# Patient Record
Sex: FEMALE | Race: BLACK OR AFRICAN AMERICAN | NOT HISPANIC OR LATINO
[De-identification: names, ages, dates, MRNs, and addresses within clinical notes are randomized per-mention and may not be internally consistent; named-entity substitution may affect disease eponyms.]

---

## 2018-12-03 ENCOUNTER — APPOINTMENT (OUTPATIENT)
Dept: INTERNAL MEDICINE | Facility: CLINIC | Age: 48
End: 2018-12-03
Payer: COMMERCIAL

## 2018-12-03 VITALS
SYSTOLIC BLOOD PRESSURE: 120 MMHG | BODY MASS INDEX: 35.16 KG/M2 | WEIGHT: 224 LBS | DIASTOLIC BLOOD PRESSURE: 80 MMHG | HEIGHT: 67 IN | HEART RATE: 64 BPM | OXYGEN SATURATION: 98 %

## 2018-12-03 DIAGNOSIS — Z87.891 PERSONAL HISTORY OF NICOTINE DEPENDENCE: ICD-10-CM

## 2018-12-03 DIAGNOSIS — R94.31 ABNORMAL ELECTROCARDIOGRAM [ECG] [EKG]: ICD-10-CM

## 2018-12-03 DIAGNOSIS — Z00.00 ENCOUNTER FOR GENERAL ADULT MEDICAL EXAMINATION W/OUT ABNORMAL FINDINGS: ICD-10-CM

## 2018-12-03 DIAGNOSIS — Z82.49 FAMILY HISTORY OF ISCHEMIC HEART DISEASE AND OTHER DISEASES OF THE CIRCULATORY SYSTEM: ICD-10-CM

## 2018-12-03 DIAGNOSIS — E66.9 OBESITY, UNSPECIFIED: ICD-10-CM

## 2018-12-03 DIAGNOSIS — K80.20 CALCULUS OF GALLBLADDER W/OUT CHOLECYSTITIS W/OUT OBSTRUCTION: ICD-10-CM

## 2018-12-03 PROCEDURE — 36415 COLL VENOUS BLD VENIPUNCTURE: CPT

## 2018-12-03 PROCEDURE — 99386 PREV VISIT NEW AGE 40-64: CPT | Mod: 25

## 2018-12-03 PROCEDURE — 93000 ELECTROCARDIOGRAM COMPLETE: CPT

## 2018-12-03 PROCEDURE — 94010 BREATHING CAPACITY TEST: CPT

## 2018-12-03 NOTE — PHYSICAL EXAM
[No Acute Distress] : no acute distress [Well Nourished] : well nourished [Well Developed] : well developed [Well-Appearing] : well-appearing [Normal Sclera/Conjunctiva] : normal sclera/conjunctiva [PERRL] : pupils equal round and reactive to light [Normal Outer Ear/Nose] : the outer ears and nose were normal in appearance [Normal Oropharynx] : the oropharynx was normal [Normal TMs] : both tympanic membranes were normal [Supple] : supple [No Lymphadenopathy] : no lymphadenopathy [Thyroid Normal, No Nodules] : the thyroid was normal and there were no nodules present [No Respiratory Distress] : no respiratory distress  [Clear to Auscultation] : lungs were clear to auscultation bilaterally [No Accessory Muscle Use] : no accessory muscle use [Normal Rate] : normal rate  [Regular Rhythm] : with a regular rhythm [Normal S1, S2] : normal S1 and S2 [No Murmur] : no murmur heard [No Edema] : there was no peripheral edema [Soft] : abdomen soft [Non Tender] : non-tender [Non-distended] : non-distended [Normal Bowel Sounds] : normal bowel sounds [Normal Supraclavicular Nodes] : no supraclavicular lymphadenopathy [Normal Axillary Nodes] : no axillary lymphadenopathy [Normal Posterior Cervical Nodes] : no posterior cervical lymphadenopathy [Normal Anterior Cervical Nodes] : no anterior cervical lymphadenopathy [Normal Inguinal Nodes] : no inguinal lymphadenopathy [Grossly Normal Strength/Tone] : grossly normal strength/tone [Coordination Grossly Intact] : coordination grossly intact [No Focal Deficits] : no focal deficits [Normal Affect] : the affect was normal [Normal Insight/Judgement] : insight and judgment were intact [Normal Appearance] : normal in appearance [No Masses] : no palpable masses [No Axillary Lymphadenopathy] : no axillary lymphadenopathy

## 2018-12-03 NOTE — HEALTH RISK ASSESSMENT
[HIV test declined] : HIV test declined [0] : 2) Feeling down, depressed, or hopeless: Not at all (0) [Patient reported PAP Smear was normal] : Patient reported PAP Smear was normal [MammogramComments] : never [PapSmearDate] : 01/2016 [de-identified] : not seen ophth [de-identified] : seen dentist 3 wk ago

## 2018-12-03 NOTE — PLAN
[FreeTextEntry1] : pt refused flu  vaccine.  pt is taking over the counter products and herbal tea- pt to stop these and f/u in 3 wk for repeat EKG\par EKG- sinus klever 51 prolonged QT\par spirometry- FEV1/ FVC nl

## 2018-12-03 NOTE — HISTORY OF PRESENT ILLNESS
[FreeTextEntry1] : CPE [de-identified] : vaccine- pt refused flu vaccine.  2017- tetanus vaccine as per pt\par diet- drinking juice.  no eating fruits, veg\par exercise- spinning classes 2 times/ wk\par

## 2018-12-04 ENCOUNTER — RESULT REVIEW (OUTPATIENT)
Age: 48
End: 2018-12-04

## 2018-12-04 LAB
ALBUMIN SERPL ELPH-MCNC: 4 G/DL
ALP BLD-CCNC: 44 U/L
ALT SERPL-CCNC: 32 U/L
ANION GAP SERPL CALC-SCNC: 11 MMOL/L
AST SERPL-CCNC: 23 U/L
BASOPHILS # BLD AUTO: 0.04 K/UL
BASOPHILS NFR BLD AUTO: 1.1 %
BILIRUB SERPL-MCNC: 0.4 MG/DL
BUN SERPL-MCNC: 12 MG/DL
CALCIUM SERPL-MCNC: 9.3 MG/DL
CHLORIDE SERPL-SCNC: 104 MMOL/L
CHOLEST SERPL-MCNC: 178 MG/DL
CHOLEST/HDLC SERPL: 2 RATIO
CO2 SERPL-SCNC: 24 MMOL/L
CREAT SERPL-MCNC: 0.85 MG/DL
EOSINOPHIL # BLD AUTO: 0.25 K/UL
EOSINOPHIL NFR BLD AUTO: 6.9 %
GLUCOSE SERPL-MCNC: 85 MG/DL
HCT VFR BLD CALC: 38.4 %
HDLC SERPL-MCNC: 89 MG/DL
HGB BLD-MCNC: 12.2 G/DL
IMM GRANULOCYTES NFR BLD AUTO: 0.3 %
LDLC SERPL CALC-MCNC: 80 MG/DL
LDLC SERPL DIRECT ASSAY-MCNC: 91 MG/DL
LYMPHOCYTES # BLD AUTO: 1.63 K/UL
LYMPHOCYTES NFR BLD AUTO: 44.8 %
MAN DIFF?: NORMAL
MCHC RBC-ENTMCNC: 30 PG
MCHC RBC-ENTMCNC: 31.8 GM/DL
MCV RBC AUTO: 94.6 FL
MONOCYTES # BLD AUTO: 0.31 K/UL
MONOCYTES NFR BLD AUTO: 8.5 %
NEUTROPHILS # BLD AUTO: 1.4 K/UL
NEUTROPHILS NFR BLD AUTO: 38.4 %
PLATELET # BLD AUTO: 167 K/UL
POTASSIUM SERPL-SCNC: 4.1 MMOL/L
PROT SERPL-MCNC: 7.7 G/DL
RBC # BLD: 4.06 M/UL
RBC # FLD: 14.7 %
SAVE SPECIMEN: NORMAL
SODIUM SERPL-SCNC: 139 MMOL/L
TRIGL SERPL-MCNC: 43 MG/DL
TSH SERPL-ACNC: 3.91 UIU/ML
WBC # FLD AUTO: 3.64 K/UL

## 2018-12-05 LAB — MAGNESIUM SERPL-MCNC: 1.9 MG/DL

## 2019-01-07 ENCOUNTER — APPOINTMENT (OUTPATIENT)
Dept: INTERNAL MEDICINE | Facility: CLINIC | Age: 49
End: 2019-01-07

## 2020-07-28 ENCOUNTER — APPOINTMENT (OUTPATIENT)
Dept: ENDOCRINOLOGY | Facility: CLINIC | Age: 50
End: 2020-07-28

## 2024-08-08 ENCOUNTER — INPATIENT (INPATIENT)
Facility: HOSPITAL | Age: 54
LOS: 0 days | Discharge: HOME | DRG: 446 | End: 2024-08-09
Attending: STUDENT IN AN ORGANIZED HEALTH CARE EDUCATION/TRAINING PROGRAM | Admitting: HOSPITALIST
Payer: SELF-PAY

## 2024-08-08 VITALS
WEIGHT: 197.98 LBS | OXYGEN SATURATION: 100 % | DIASTOLIC BLOOD PRESSURE: 78 MMHG | SYSTOLIC BLOOD PRESSURE: 120 MMHG | HEIGHT: 67 IN | TEMPERATURE: 98 F | HEART RATE: 53 BPM | RESPIRATION RATE: 17 BRPM

## 2024-08-08 DIAGNOSIS — K80.20 CALCULUS OF GALLBLADDER WITHOUT CHOLECYSTITIS WITHOUT OBSTRUCTION: ICD-10-CM

## 2024-08-08 DIAGNOSIS — R10.9 UNSPECIFIED ABDOMINAL PAIN: ICD-10-CM

## 2024-08-08 DIAGNOSIS — K82.8 OTHER SPECIFIED DISEASES OF GALLBLADDER: ICD-10-CM

## 2024-08-08 DIAGNOSIS — Z98.89 OTHER SPECIFIED POSTPROCEDURAL STATES: Chronic | ICD-10-CM

## 2024-08-08 LAB
ALBUMIN SERPL ELPH-MCNC: 4 G/DL — SIGNIFICANT CHANGE UP (ref 3.3–5)
ALP SERPL-CCNC: 57 U/L — SIGNIFICANT CHANGE UP (ref 40–120)
ALT FLD-CCNC: 15 U/L — SIGNIFICANT CHANGE UP (ref 10–45)
ANION GAP SERPL CALC-SCNC: 13 MMOL/L — SIGNIFICANT CHANGE UP (ref 5–17)
APPEARANCE UR: CLEAR — SIGNIFICANT CHANGE UP
AST SERPL-CCNC: 20 U/L — SIGNIFICANT CHANGE UP (ref 10–40)
BACTERIA # UR AUTO: ABNORMAL /HPF
BASOPHILS # BLD AUTO: 0.04 K/UL — SIGNIFICANT CHANGE UP (ref 0–0.2)
BASOPHILS NFR BLD AUTO: 0.9 % — SIGNIFICANT CHANGE UP (ref 0–2)
BILIRUB SERPL-MCNC: 0.4 MG/DL — SIGNIFICANT CHANGE UP (ref 0.2–1.2)
BILIRUB UR-MCNC: NEGATIVE — SIGNIFICANT CHANGE UP
BUN SERPL-MCNC: 11 MG/DL — SIGNIFICANT CHANGE UP (ref 7–23)
CALCIUM SERPL-MCNC: 9.1 MG/DL — SIGNIFICANT CHANGE UP (ref 8.4–10.5)
CAST: 0 /LPF — SIGNIFICANT CHANGE UP (ref 0–4)
CHLORIDE SERPL-SCNC: 105 MMOL/L — SIGNIFICANT CHANGE UP (ref 96–108)
CO2 SERPL-SCNC: 22 MMOL/L — SIGNIFICANT CHANGE UP (ref 22–31)
COLOR SPEC: YELLOW — SIGNIFICANT CHANGE UP
CREAT SERPL-MCNC: 0.92 MG/DL — SIGNIFICANT CHANGE UP (ref 0.5–1.3)
DIFF PNL FLD: NEGATIVE — SIGNIFICANT CHANGE UP
EGFR: 75 ML/MIN/1.73M2 — SIGNIFICANT CHANGE UP
EOSINOPHIL # BLD AUTO: 0.25 K/UL — SIGNIFICANT CHANGE UP (ref 0–0.5)
EOSINOPHIL NFR BLD AUTO: 5.9 % — SIGNIFICANT CHANGE UP (ref 0–6)
GLUCOSE SERPL-MCNC: 95 MG/DL — SIGNIFICANT CHANGE UP (ref 70–99)
GLUCOSE UR QL: NEGATIVE MG/DL — SIGNIFICANT CHANGE UP
HCG SERPL-ACNC: <2 MIU/ML — SIGNIFICANT CHANGE UP
HCT VFR BLD CALC: 39.2 % — SIGNIFICANT CHANGE UP (ref 34.5–45)
HGB BLD-MCNC: 12.4 G/DL — SIGNIFICANT CHANGE UP (ref 11.5–15.5)
IMM GRANULOCYTES NFR BLD AUTO: 0.2 % — SIGNIFICANT CHANGE UP (ref 0–0.9)
KETONES UR-MCNC: NEGATIVE MG/DL — SIGNIFICANT CHANGE UP
LEUKOCYTE ESTERASE UR-ACNC: NEGATIVE — SIGNIFICANT CHANGE UP
LIDOCAIN IGE QN: 13 U/L — SIGNIFICANT CHANGE UP (ref 7–60)
LYMPHOCYTES # BLD AUTO: 1.74 K/UL — SIGNIFICANT CHANGE UP (ref 1–3.3)
LYMPHOCYTES # BLD AUTO: 41.1 % — SIGNIFICANT CHANGE UP (ref 13–44)
MCHC RBC-ENTMCNC: 29.7 PG — SIGNIFICANT CHANGE UP (ref 27–34)
MCHC RBC-ENTMCNC: 31.6 GM/DL — LOW (ref 32–36)
MCV RBC AUTO: 94 FL — SIGNIFICANT CHANGE UP (ref 80–100)
MONOCYTES # BLD AUTO: 0.31 K/UL — SIGNIFICANT CHANGE UP (ref 0–0.9)
MONOCYTES NFR BLD AUTO: 7.3 % — SIGNIFICANT CHANGE UP (ref 2–14)
NEUTROPHILS # BLD AUTO: 1.88 K/UL — SIGNIFICANT CHANGE UP (ref 1.8–7.4)
NEUTROPHILS NFR BLD AUTO: 44.6 % — SIGNIFICANT CHANGE UP (ref 43–77)
NITRITE UR-MCNC: NEGATIVE — SIGNIFICANT CHANGE UP
NRBC # BLD: 0 /100 WBCS — SIGNIFICANT CHANGE UP (ref 0–0)
NT-PROBNP SERPL-SCNC: 156 PG/ML — SIGNIFICANT CHANGE UP (ref 0–300)
PH UR: 6 — SIGNIFICANT CHANGE UP (ref 5–8)
PLATELET # BLD AUTO: 158 K/UL — SIGNIFICANT CHANGE UP (ref 150–400)
POTASSIUM SERPL-MCNC: 3.7 MMOL/L — SIGNIFICANT CHANGE UP (ref 3.5–5.3)
POTASSIUM SERPL-SCNC: 3.7 MMOL/L — SIGNIFICANT CHANGE UP (ref 3.5–5.3)
PROT SERPL-MCNC: 7.6 G/DL — SIGNIFICANT CHANGE UP (ref 6–8.3)
PROT UR-MCNC: NEGATIVE MG/DL — SIGNIFICANT CHANGE UP
RBC # BLD: 4.17 M/UL — SIGNIFICANT CHANGE UP (ref 3.8–5.2)
RBC # FLD: 13.3 % — SIGNIFICANT CHANGE UP (ref 10.3–14.5)
RBC CASTS # UR COMP ASSIST: 4 /HPF — SIGNIFICANT CHANGE UP (ref 0–4)
SODIUM SERPL-SCNC: 140 MMOL/L — SIGNIFICANT CHANGE UP (ref 135–145)
SP GR SPEC: 1.02 — SIGNIFICANT CHANGE UP (ref 1–1.03)
SQUAMOUS # UR AUTO: 3 /HPF — SIGNIFICANT CHANGE UP (ref 0–5)
TROPONIN T, HIGH SENSITIVITY RESULT: <6 NG/L — SIGNIFICANT CHANGE UP (ref 0–51)
UROBILINOGEN FLD QL: 0.2 MG/DL — SIGNIFICANT CHANGE UP (ref 0.2–1)
WBC # BLD: 4.23 K/UL — SIGNIFICANT CHANGE UP (ref 3.8–10.5)
WBC # FLD AUTO: 4.23 K/UL — SIGNIFICANT CHANGE UP (ref 3.8–10.5)
WBC UR QL: 2 /HPF — SIGNIFICANT CHANGE UP (ref 0–5)

## 2024-08-08 PROCEDURE — 76705 ECHO EXAM OF ABDOMEN: CPT | Mod: 26

## 2024-08-08 PROCEDURE — 71045 X-RAY EXAM CHEST 1 VIEW: CPT | Mod: 26

## 2024-08-08 PROCEDURE — 99253 IP/OBS CNSLTJ NEW/EST LOW 45: CPT

## 2024-08-08 PROCEDURE — 99285 EMERGENCY DEPT VISIT HI MDM: CPT

## 2024-08-08 PROCEDURE — 74177 CT ABD & PELVIS W/CONTRAST: CPT | Mod: 26,MC

## 2024-08-08 PROCEDURE — 99223 1ST HOSP IP/OBS HIGH 75: CPT | Mod: GC

## 2024-08-08 RX ORDER — IBUPROFEN 200 MG
600 TABLET ORAL ONCE
Refills: 0 | Status: COMPLETED | OUTPATIENT
Start: 2024-08-08 | End: 2024-08-08

## 2024-08-08 RX ORDER — ALPRAZOLAM 0.5 MG
1 TABLET ORAL ONCE
Refills: 0 | Status: DISCONTINUED | OUTPATIENT
Start: 2024-08-08 | End: 2024-08-09

## 2024-08-08 RX ORDER — PIPERACILLIN SODIUM, TAZOBACTAM SODIUM 3; .375 G/15ML; G/15ML
3.38 INJECTION, POWDER, LYOPHILIZED, FOR SOLUTION INTRAVENOUS EVERY 8 HOURS
Refills: 0 | Status: DISCONTINUED | OUTPATIENT
Start: 2024-08-08 | End: 2024-08-09

## 2024-08-08 RX ORDER — PIPERACILLIN SODIUM, TAZOBACTAM SODIUM 3; .375 G/15ML; G/15ML
3.38 INJECTION, POWDER, LYOPHILIZED, FOR SOLUTION INTRAVENOUS ONCE
Refills: 0 | Status: COMPLETED | OUTPATIENT
Start: 2024-08-08 | End: 2024-08-08

## 2024-08-08 RX ADMIN — Medication 600 MILLIGRAM(S): at 23:35

## 2024-08-08 RX ADMIN — Medication 600 MILLIGRAM(S): at 10:38

## 2024-08-08 RX ADMIN — Medication 600 MILLIGRAM(S): at 12:00

## 2024-08-08 RX ADMIN — PIPERACILLIN SODIUM, TAZOBACTAM SODIUM 25 GRAM(S): 3; .375 INJECTION, POWDER, LYOPHILIZED, FOR SOLUTION INTRAVENOUS at 23:35

## 2024-08-08 RX ADMIN — PIPERACILLIN SODIUM, TAZOBACTAM SODIUM 200 GRAM(S): 3; .375 INJECTION, POWDER, LYOPHILIZED, FOR SOLUTION INTRAVENOUS at 15:50

## 2024-08-08 NOTE — ED ADULT NURSE NOTE - ED STAT RN HANDOFF DETAILS
1900 Report given to receiving change of shift SERA BEAN patient is in no acute distress. Patient vital signs stable, plan of care explained, in purple

## 2024-08-08 NOTE — ED PROVIDER NOTE - CLINICAL SUMMARY MEDICAL DECISION MAKING FREE TEXT BOX
Cindy: Patient is a 53-year-old who presents to the emergency department with few days of abdominal pain.  Patient finds herself to be more bloated and has pain in the epigastrium and right upper quadrant.  Patient with normal BMs and is urinating normally.  Patient with no CVA tenderness.  Patient on exam has tenderness in the epigastrium and right upper quadrant.  Will look for colitis or gallbladder disease if it is not that gastritis is possible.  Will get CAT scan to evaluate this and labs and urine.

## 2024-08-08 NOTE — H&P ADULT - HISTORY OF PRESENT ILLNESS
54 yo female with no known past medical history presents with right upper quadrant and epigastric pain. The pain worsens when she takes a breath. Denies headache, dizziness, abdominal pain, or urinary symptoms.  53F with no known PMHx presented to ED for abdominal pain x 3 days. Pt states that pain started 3 days ago, also endorses more bloating than usual. Pain located in RUQ and LUQ w/ radiation to the right upper back and L chest. RUQ pain increased w/ inspiration. Denies dysuria. Normal bowel movements. Denies chest pain, nausea/vomiting, dizziness, headaches, fever/chills.    ED course: Pt afebrile, bradycardic (49-53), BP stable. CBC/CMP/urinalysis/CXR unremarkable. S/p 3.375mg Zosyn. CT abd/pelvis revealed marked distension of the gallbladder containing sludge vs. stones with minimal wall thickening, with possibility of acute cholecystitis, as well as mild central intrahepatic biliary duct dilation and ill-defined soft tissue infiltration in the mary hepatis. RUQ US revealed gallbladder distention w/ multiple stones w/o additional evidence of acute cholecystitis. MRCP ordered and pending. Surgery evaluated pt in ED due to c/f acute cholecystitis vs. symptomatic cholelithiasis, with recommendation for lap cholecystectomy. Pt declined surgery and was admitted for medical management. 53F with no known PMHx presented to ED for abdominal pain x 3 days. Pt states that pain started on Monday, 8/5, and acutely worsened yesterday to 10/10 pain that kept her awake last night. The pain started in the middle epigastric region with radiation to right flank around to her right scapula. RUQ pain increased w/ inspiration. The patient has had dark green stools over this same time period which is unusual for her. No diarrhea, no vomiting, no nausea. She also states that she has also felt more stuffed and bloated and has not been able to burp despite feeling the urge. She had a similar episode around 9-10yrs ago that she was treated for at the hospital with medication. Denies chest pain, recent infection, dysuria, nausea/vomiting, dizziness, headaches, fever/chills.     ED course: Pt afebrile, bradycardic (49-53), BP stable. CBC/CMP/urinalysis/CXR unremarkable. S/p 3.375mg Zosyn. CT abd/pelvis revealed marked distension of the gallbladder containing sludge vs. stones with minimal wall thickening, with possibility of acute cholecystitis, as well as mild central intrahepatic biliary duct dilation and ill-defined soft tissue infiltration in the mary hepatis. RUQ US revealed gallbladder distention w/ multiple stones w/o additional evidence of acute cholecystitis. MRCP ordered and pending. Surgery evaluated pt in ED due to c/f acute cholecystitis vs. symptomatic cholelithiasis, with recommendation for lap cholecystectomy. Pt declined surgery and was admitted for medical management.

## 2024-08-08 NOTE — CONSULT NOTE ADULT - SUBJECTIVE AND OBJECTIVE BOX
GENERAL SURGERY CONSULT NOTE  --------------------------------------------------------------------------------------------    HPI:   Patient is a 53y old  F w no PMH, PSHx  x 3 who presents with 3 days of RUQ pain. Pain starts in back and radiates to epigastrium. Denies fevers, chills, nausea, vomiting, PO intolerance, diarrhea or constipation.       PAST MEDICAL & SURGICAL HISTORY:  Acute cholecystitis    History of  section  x3        FAMILY HISTORY:  No pertinent family history in first degree relatives    [] Family history not pertinent as reviewed with the patient and family    SOCIAL HISTORY:  Social alcohol use.  No tobacco.   Teacher    ALLERGIES: Darvon (Hives)  Darvocet A500 (Unknown)  Percocet 10/325 (Unknown)      HOME MEDICATIONS: No meds.     CURRENT MEDICATIONS  MEDICATIONS (STANDING):   MEDICATIONS (PRN):  --------------------------------------------------------------------------------------------    Vitals:   T(C): 36.7 (24 @ 12:56), Max: 36.9 (24 @ 09:11)  HR: 49 (24 @ 12:56) (49 - 53)  BP: 112/64 (24 @ 12:56) (112/64 - 124/70)  RR: 18 (24 @ 12:56) (17 - 18)  SpO2: 100% (24 @ 12:56) (100% - 100%)  CAPILLARY BLOOD GLUCOSE          Height (cm): 170.2 (:11)  Weight (kg): 89.8 (:11)  BMI (kg/m2): 31 (:11)  BSA (m2): 2.01 (:11)      PHYSICAL EXAM:   General: NAD, Lying in bed comfortably  Neuro: A+Ox3  HEENT: NC/AT, EOMI  Neck: Soft, supple  Cardio: RRR  Resp: Good effort, nonlabored breathing on room air  GI/Abd: Soft, TTP RUQ, ND, no rebound/guarding, no masses palpated  Vascular: All 4 extremities warm.  Skin: Intact, no breakdown  Lymphatic/Nodes: No palpable lymphadenopathy  Musculoskeletal: All 4 extremities moving spontaneously, no limitations  --------------------------------------------------------------------------------------------    LABS  CBC (:53)                              12.4                           4.23    )----------------(  158        44.6  % Neutrophils, 41.1  % Lymphocytes, ANC: 1.88                                39.2      BMP (:53)             140     |  105     |  11    		Ca++ --      Ca 9.1                ---------------------------------( 95    		Mg --                 3.7     |  22      |  0.92  			Ph --        LFTs (:53)      TPro 7.6 / Alb 4.0 / TBili 0.4 / DBili -- / AST 20 / ALT 15 / AlkPhos 57          VBG ( 09:55)     7.33 / 54<H> / 33 / 28 / 1.6 / 49.3<L>%     Lactate: 1.4    --------------------------------------------------------------------------------------------    MICROBIOLOGY  Urinalysis ( @ 10:23):     Color: Yellow / Appearance: Clear / S.018 / pH: 6.0 / Gluc: Negative / Ketones: Negative / Bili: Negative / Urobili: 0.2 / Protein :Negative / Nitrites: Negative / Leuk.Est: Negative / RBC: 4 / WBC: 2 / Sq Epi:  / Non Sq Epi: 3 / Bacteria Occasional<!>         --------------------------------------------------------------------------------------------    IMAGING  < from: CT Abdomen and Pelvis w/ IV Cont (24 @ 11:49) >  FINDINGS:  LOWER CHEST: Within normal limits.    LIVER: Indeterminate hypodense lesion measuring 1.3 cm in segment 8   (series 5 image 20), corresponding to a T2 hyperintense lesion that   measured 1.3 cm on the MRI of 1/3/2016, and likely benign.  BILE DUCTS: No overt extrahepatic biliary duct dilation. Mild central   intrahepatic biliary duct dilation. Ill-defined soft tissue infiltration   in the mary hepatis.  GALLBLADDER: Markedly distended. Minimal wall thickening. No significant   pericholecystic fat infiltration appreciated along the fundus or body.   There may be some infiltration near the neck/mary hepatis. Faint   dependent hyperdensity suggestive of stones or sludge.  SPLEEN: Within normal limits.  PANCREAS: Within normal limits.  ADRENALS: Within normal limits.  KIDNEYS/URETERS: Excreted contrast within the collecting systems. No   hydronephrosis. Left renal cyst.    BLADDER: Within normal limits.  REPRODUCTIVE ORGANS: Uterus and adnexa within normal limits.    BOWEL: No bowel obstruction. Appendix is normal.  PERITONEUM/RETROPERITONEUM: Within normal limits.  VESSELS: Mild atherosclerotic changes.  LYMPH NODES: No lymphadenopathy.  ABDOMINAL WALL: Tiny fat-containing umbilical hernia.  BONES: Mild degenerative changes.    IMPRESSION:  *  Marked distention of the gallbladder containing sludge versus stones   with minimal wall thickening. Findings raise the possibility for acute   cholecystitis. Consider further evaluation with right upper quadrant   ultrasound or HIDA scan.  *  Mild central intrahepatic biliary duct dilation. Ill-defined soft   tissue infiltration in the mary hepatis, potentially related to   inflammation from the gallbladder, with other etiologies not excluded.   Consider evaluation with contrast enhanced abdominal MRI/MRCP.    < end of copied text >

## 2024-08-08 NOTE — H&P ADULT - ATTENDING COMMENTS
53F with no PMH p/w RUQ/epigastric pain x 3 days; endorsing progressively worsening pain, mostly in epigastric to RUQ and radiating to her back, no n/v, no diarrhea, no fever or chills; had similar pain about 10 years ago, does not fully recall work-up at the time.   States abd pain is already improved from prior.     Vitals stable, afebrile.   Exam notable for tenderness in RUQ region on palpation, otherwise normal exam   Labs with normal WBC, normal Cr  Imaging with c/f acute guille, +stones/sludge on US; also with "Ill-defined soft tissue infiltration in the mary hepatis" of unclear etiology     #acute cholecytistis   #abnormal CT abdomen    - c/w zosyn for now   - pt refusing surgical intervention, c/w medical management   - will check MRCP to further eval soft tisse at the mary hepatis; xanax prior as pt with claustrophobia   - monitor vitals and labs   - if MRCP is unremarkable, likely d/c planning in 1-2 days on PO abx

## 2024-08-08 NOTE — H&P ADULT - PROBLEM SELECTOR PLAN 1
Patient with 4 days of right upper quadrant pain prior to admission. RUQ US found to have gallbladder distension w/ stones, CT abd with gallbladder distension with sludge vs. stones, mild central intrahepatic biliary duct dilation and ill-defined soft tissue infiltration in the portal hepatis. Acute cholecystitis vs. symptomatic cholelithiasis. WBC, Ck, ALT/AST all within normal limits.      Plan:  - S/p 3.375mg Zosyn  - Ibuprofen 600 for pain  - MRCP eval

## 2024-08-08 NOTE — CONSULT NOTE ADULT - ASSESSMENT
53 F with no PMH, Pshx  x 3, p/w 3 days of RUQ pain. AVSS. WBC wnl. T bili wnl. CT showing distended gallbladder, sludge vs stones, ?ill defined soft tissue infiltation. U/S showing distended gallbladder, neg thomas sign, immobile stone in neck. Concern for acute cholecystitis vs symptomatic cholelithiasis.     Discussed with patient the recommendation for laparoscopic cholecystectomy. Patient refusing surgery. Discussed risk of refusing surgery, including continued pain, worsening infection, sepsis, the need for emergent surgery in the future, and possible death. Patient accepts these risks.     Plan:  - rec admission to medicine for pain control and IV abx  - rec obtaining MRCP to eval "ill defined soft tissue infiltation"  - surgery to follow    Discussed with surgical attending Dr. Dias    ACS/Trauma Surgery  [phone]

## 2024-08-08 NOTE — ED PROVIDER NOTE - PROGRESS NOTE DETAILS
Grady Calhoun PA-C: results reviewed. spoke with surgery for consult. pending eval at this time. Grady Calhoun PA-C: seen by surgery. patient declined surgery at this time. surgery recommends medicine admission for IV abx and MRCP. discussed plan with ED attending.

## 2024-08-08 NOTE — ED PROVIDER NOTE - OBJECTIVE STATEMENT
52 y/o female, denies pmh, presents tot he ER for abdominal pain and SOB.  abdominal pain started three days ago. states located to b/l upper quadrants and radiates to right upper back as well as left side of her chest. patient  has also been experiencing intermittent SOB.  recently traveled to [place] and returned about two weeks ago.  developed intermittent SOB 48 hours after she returned. patient denies f/n/v/d, HA, dizziness, abdominal pain, urinary symptoms. 52 y/o female, denies pmh, presents tot he ER for abdominal pain. states abdominal pain started three days ago. states located to b/l upper quadrants and radiates to right upper back as well as left side of her chest. states when she takes a breath feels the pain to her RUQ. patient denies f/n/v/d, HA, dizziness, abdominal pain, urinary symptoms.

## 2024-08-08 NOTE — ED PROVIDER NOTE - ATTENDING APP SHARED VISIT CONTRIBUTION OF CARE
I performed a history and physical exam of the patient and discussed their management with the resident and /or advanced care provider. I reviewed the resident and /or ACP's note and agree with the documented findings and plan of care. My medical decision making and observations are found above.  rt periumbilical and upper pain, lungs clear

## 2024-08-08 NOTE — H&P ADULT - NSHPPHYSICALEXAM_GEN_ALL_CORE
PHYSICAL EXAM:  GENERAL: NAD, lying in bed comfortably  HEAD:  Atraumatic, Normocephalic  EYES: EOMI, PERRL, conjunctiva and sclera clear  ENT: Moist mucous membranes  CHEST/LUNG: Clear to auscultation bilaterally; No rales, rhonchi, wheezing, or rubs. Unlabored respirations  HEART: Regular rate and rhythm; No murmurs, rubs, or gallops  ABDOMEN: Bowel sounds present; Soft, Nontender, Nondistended.  EXTREMITIES:  2+ Peripheral Pulses, brisk capillary refill. No clubbing, cyanosis, or edema  NERVOUS SYSTEM:  Alert & Oriented X3, speech clear. No deficits   MSK: FROM all 4 extremities, full and equal strength  SKIN: No rashes or lesions on exposed area. No jaundice.

## 2024-08-08 NOTE — ED ADULT NURSE NOTE - OBJECTIVE STATEMENT
54yo F aaox4 LMP 06/24, no past medical history, presents to ED, as per pt 4 days ago sudden onset a radiated RUQ pain with radiation to the back, and sob, , on examinations Pt denies CP,, HA, vision changes, n/v/d, fevers chills, weakness, dizziness, URI symptoms Safety and comfort measures initiated- bed placed in lowest position and side rails raised.

## 2024-08-08 NOTE — H&P ADULT - ASSESSMENT
53F with no known PMHx presented to ED for 3 days of RUQ/LUQ pain, on RUQ US found to have gallbladder distension w/ stones, CT abd with gallbladder distension with sludge vs. stones, mild central intrahepatic biliary duct dilation and ill-defined soft tissue infiltration in the mary hepatis. Pt evaluated by surgery in ED with recommendation for laparoscopic cholecystectomy, declined surgical intervention, now admitted for medical management of acute cholecystitis vs. symptomatic cholelithiasis. MCRP ordered, currently pending.        53F with no known PMHx presented to ED for 4 days of RUQ/LUQ pain, on RUQ US found to have gallbladder distension w/ stones, CT abd with gallbladder distension with sludge vs. stones, mild central intrahepatic biliary duct dilation and ill-defined soft tissue infiltration in the portal hepatis. Pt evaluated by surgery in ED with recommendation for laparoscopic cholecystectomy, declined surgical intervention, now admitted for medical management of acute cholecystitis vs. symptomatic cholelithiasis. MCRP ordered, currently pending.

## 2024-08-09 ENCOUNTER — TRANSCRIPTION ENCOUNTER (OUTPATIENT)
Age: 54
End: 2024-08-09

## 2024-08-09 VITALS
RESPIRATION RATE: 18 BRPM | SYSTOLIC BLOOD PRESSURE: 111 MMHG | DIASTOLIC BLOOD PRESSURE: 69 MMHG | TEMPERATURE: 98 F | HEART RATE: 70 BPM | OXYGEN SATURATION: 100 %

## 2024-08-09 LAB
CULTURE RESULTS: SIGNIFICANT CHANGE UP
SPECIMEN SOURCE: SIGNIFICANT CHANGE UP

## 2024-08-09 PROCEDURE — 84702 CHORIONIC GONADOTROPIN TEST: CPT

## 2024-08-09 PROCEDURE — 76705 ECHO EXAM OF ABDOMEN: CPT

## 2024-08-09 PROCEDURE — 99285 EMERGENCY DEPT VISIT HI MDM: CPT

## 2024-08-09 PROCEDURE — 81001 URINALYSIS AUTO W/SCOPE: CPT

## 2024-08-09 PROCEDURE — 82947 ASSAY GLUCOSE BLOOD QUANT: CPT

## 2024-08-09 PROCEDURE — 84484 ASSAY OF TROPONIN QUANT: CPT

## 2024-08-09 PROCEDURE — 85014 HEMATOCRIT: CPT

## 2024-08-09 PROCEDURE — 71045 X-RAY EXAM CHEST 1 VIEW: CPT

## 2024-08-09 PROCEDURE — 83880 ASSAY OF NATRIURETIC PEPTIDE: CPT

## 2024-08-09 PROCEDURE — 80053 COMPREHEN METABOLIC PANEL: CPT

## 2024-08-09 PROCEDURE — 84132 ASSAY OF SERUM POTASSIUM: CPT

## 2024-08-09 PROCEDURE — 83690 ASSAY OF LIPASE: CPT

## 2024-08-09 PROCEDURE — 84295 ASSAY OF SERUM SODIUM: CPT

## 2024-08-09 PROCEDURE — 85025 COMPLETE CBC W/AUTO DIFF WBC: CPT

## 2024-08-09 PROCEDURE — 82330 ASSAY OF CALCIUM: CPT

## 2024-08-09 PROCEDURE — 85018 HEMOGLOBIN: CPT

## 2024-08-09 PROCEDURE — 83605 ASSAY OF LACTIC ACID: CPT

## 2024-08-09 PROCEDURE — 87086 URINE CULTURE/COLONY COUNT: CPT

## 2024-08-09 PROCEDURE — 82803 BLOOD GASES ANY COMBINATION: CPT

## 2024-08-09 PROCEDURE — 74177 CT ABD & PELVIS W/CONTRAST: CPT | Mod: MC

## 2024-08-09 PROCEDURE — 82435 ASSAY OF BLOOD CHLORIDE: CPT

## 2024-08-09 PROCEDURE — 99239 HOSP IP/OBS DSCHRG MGMT >30: CPT | Mod: GC

## 2024-08-09 RX ORDER — METRONIDAZOLE 500 MG/1
1 TABLET ORAL
Qty: 21 | Refills: 0
Start: 2024-08-09 | End: 2024-08-15

## 2024-08-09 RX ORDER — CIPROFLOXACIN 500 MG/1
1 TABLET, FILM COATED ORAL
Qty: 14 | Refills: 0
Start: 2024-08-09 | End: 2024-08-15

## 2024-08-09 RX ADMIN — PIPERACILLIN SODIUM, TAZOBACTAM SODIUM 25 GRAM(S): 3; .375 INJECTION, POWDER, LYOPHILIZED, FOR SOLUTION INTRAVENOUS at 07:55

## 2024-08-09 NOTE — PROGRESS NOTE ADULT - ATTENDING COMMENTS
53F with no PMH p/w RUQ/epigastric pain x 3 days; endorsing progressively worsening pain, mostly in epigastric to RUQ and radiating to her back, a/w acute guille; pt refusing surgical intervention. CT showing "Ill-defined soft tissue infiltration in the mary hepatis" of unclear etiology, recommended for MRCP.   Pt feeling much better, no abd pain, afebrile  Pt refusing inpt MRI - explained about reason for it, including but not limited to possible malignancy - pt with severe claustrophobia, unable to obtain MRI inpt and wants to follow-up with outpt PMD for open MRI.     #acute cholecytistis   #abnormal CT abdomen    - on zosyn inpt --> transition to oral cipro/flagyl for 5-7 days   - pt refusing surgical intervention, c/w medical management   - pt refusing MRCP as above, wishes to f/u as outpt for open MRI and will contact her PMD ASAP     d/c planning home today

## 2024-08-09 NOTE — DISCHARGE NOTE PROVIDER - CARE PROVIDERS DIRECT ADDRESSES
[email or web address]

## 2024-08-09 NOTE — PROGRESS NOTE ADULT - SUBJECTIVE AND OBJECTIVE BOX
PROGRESS NOTE:   Authored by Pina Gottlieb MD  Internal Medicine Resident Physician, PGY-1      Patient is a 53y old  Female who presents with a chief complaint of     SUBJECTIVE / OVERNIGHT EVENTS: Patient seen and examined at bedside. Discussed patient's reluctance to go in MRI due to clostrophobia.     MEDICATIONS  (STANDING):  piperacillin/tazobactam IVPB.. 3.375 Gram(s) IV Intermittent every 8 hours    MEDICATIONS  (PRN):  ALPRAZolam 1 milliGRAM(s) Oral once PRN Prior to MRCP      CAPILLARY BLOOD GLUCOSE        I&O's Summary      PHYSICAL EXAM:  Vital Signs Last 24 Hrs  T(C): 36.6 (09 Aug 2024 09:39), Max: 36.7 (08 Aug 2024 12:56)  T(F): 97.9 (09 Aug 2024 09:39), Max: 98 (08 Aug 2024 12:56)  HR: 70 (09 Aug 2024 09:39) (49 - 70)  BP: 111/69 (09 Aug 2024 09:39) (109/64 - 136/82)  BP(mean): --  RR: 18 (09 Aug 2024 09:39) (18 - 18)  SpO2: 100% (09 Aug 2024 09:39) (95% - 100%)    Parameters below as of 09 Aug 2024 09:39  Patient On (Oxygen Delivery Method): room air      CONSTITUTIONAL: Well-groomed, in no apparent distress  RESPIRATORY: Breathing comfortably; no dullness to percussion; lungs CTA without wheeze/rhonchi/rales  CARDIOVASCULAR: +S1S2, RRR, no M/G/R; pedal pulses full and symmetric; no lower extremity edema  GASTROINTESTINAL: No palpable masses or tenderness, +BS throughout, no rebound/guarding; no hepatosplenomegaly; no hernia palpated  SKIN: No rashes or ulcers noted  NEUROLOGIC: A+O x 3, CN II-XII intact; sensation intact in LEs b/l to light touch    LABS:                        12.4   4.23  )-----------( 158      ( 08 Aug 2024 09:53 )             39.2     08-08    140  |  105  |  11  ----------------------------<  95  3.7   |  22  |  0.92    Ca    9.1      08 Aug 2024 09:53    TPro  7.6  /  Alb  4.0  /  TBili  0.4  /  DBili  x   /  AST  20  /  ALT  15  /  AlkPhos  57            Urinalysis Basic - ( 08 Aug 2024 10:23 )    Color: Yellow / Appearance: Clear / S.018 / pH: x  Gluc: x / Ketone: Negative mg/dL  / Bili: Negative / Urobili: 0.2 mg/dL   Blood: x / Protein: Negative mg/dL / Nitrite: Negative   Leuk Esterase: Negative / RBC: 4 /HPF / WBC 2 /HPF   Sq Epi: x / Non Sq Epi: 3 /HPF / Bacteria: Occasional /HPF          RADIOLOGY & ADDITIONAL TESTS:  Results Reviewed:   Imaging Personally Reviewed:  Electrocardiogram Personally Reviewed:

## 2024-08-09 NOTE — DISCHARGE NOTE NURSING/CASE MANAGEMENT/SOCIAL WORK - NSDCFUADDAPPT_GEN_ALL_CORE_FT
APPTS ARE READY TO BE MADE: [X] YES    Best Family or Patient Contact (if needed):    Additional Information about above appointments (if needed):    1: Dr. Dias General surgery within 1 month  2: Dr Choi PCP within 1 week   3:     Other comments or requests:

## 2024-08-09 NOTE — DISCHARGE NOTE PROVIDER - NSDCMRMEDTOKEN_GEN_ALL_CORE_FT
multivitamin:   1 tablet daily  PriLOSEC OTC 20 mg oral delayed release tablet: 1 tab(s) orally once a day, As Needed for burning pain in stomcah   ciprofloxacin 500 mg oral tablet: 1 tab(s) orally every 12 hours  metroNIDAZOLE 500 mg oral tablet: 1 tab(s) orally every 8 hours  multivitamin:   1 tablet daily  PriLOSEC OTC 20 mg oral delayed release tablet: 1 tab(s) orally once a day, As Needed for burning pain in stomcah   ciprofloxacin 500 mg oral tablet: 1 tab(s) orally every 12 hours  metroNIDAZOLE 500 mg oral tablet: 1 tab(s) orally every 8 hours

## 2024-08-09 NOTE — DISCHARGE NOTE PROVIDER - NSDCFUADDAPPT_GEN_ALL_CORE_FT
APPTS ARE READY TO BE MADE: [ ] YES    Best Family or Patient Contact (if needed):    Additional Information about above appointments (if needed):    1: Dr. Dias General surgery within 1 month  2: Dr Choi PCP within 1 week   3:     Other comments or requests:    APPTS ARE READY TO BE MADE: [X] YES    Best Family or Patient Contact (if needed):    Additional Information about above appointments (if needed):    1: Dr. Dias General surgery within 1 month  2: Dr Choi PCP within 1 week   3:     Other comments or requests:    APPTS ARE READY TO BE MADE: [X] YES    Best Family or Patient Contact (if needed):    Additional Information about above appointments (if needed):    1: Dr. Dias General surgery within 1 month  2: Dr Choi PCP within 1 week   3:     Other comments or requests:   Provided patient with provider referral information, however patient prefers to schedule the appointments on their own.

## 2024-08-09 NOTE — DISCHARGE NOTE PROVIDER - NSDCCPTREATMENT_GEN_ALL_CORE_FT
PRINCIPAL PROCEDURE  Procedure: Abdomen CT  Findings and Treatment: IMPRESSION:  *  Marked distention of the gallbladder containing sludge versus stones   with minimal wall thickening. Findings raise the possibility for acute   cholecystitis. Consider further evaluation with right upper quadrant   ultrasound or HIDA scan.  *  Mild central intrahepatic biliary duct dilation. Ill-defined soft   tissue infiltration in the mary hepatis, potentially related to   inflammation from the gallbladder, with other etiologies not excluded.   Consider evaluation with contrast enhanced abdominal MRI/MRCP.

## 2024-08-09 NOTE — DISCHARGE NOTE PROVIDER - PROVIDER TOKENS
PROVIDER:[TOKEN:[[id number]],FOLLOWUP:[1 month]],PROVIDER:[TOKEN:[[id number]],FOLLOWUP:[1 week]]

## 2024-08-09 NOTE — DISCHARGE NOTE NURSING/CASE MANAGEMENT/SOCIAL WORK - PATIENT PORTAL LINK FT
You can access the FollowMyHealth Patient Portal offered by [facility] by registering at the following website: [email or web address]. By joining [facility]’s FollowMyHealth portal, you will also be able to view your health information using other applications (apps) compatible with our system.

## 2024-08-09 NOTE — PROGRESS NOTE ADULT - ASSESSMENT
53F with no known PMHx presented to ED for 4 days of RUQ/LUQ pain, on RUQ US found to have gallbladder distension w/ stones, CT abd with gallbladder distension with sludge vs. stones, mild central intrahepatic biliary duct dilation and ill-defined soft tissue infiltration in the portal hepatis. Pt evaluated by surgery in ED with recommendation for laparoscopic cholecystectomy, declined surgical intervention, now admitted for medical management of acute cholecystitis vs. symptomatic cholelithiasis. MCRP ordered, currently pending.

## 2024-08-09 NOTE — DISCHARGE NOTE PROVIDER - CARE PROVIDER_API CALL
Fred Singh  Surgery  [address], [address]  [place] [address]  Phone: [phone]  Fax: [phone]  Follow Up Time: 1 month    [facility]  Internal Medicine  [address], [address]  [place] [address]  Phone: [phone]  Fax: [phone]  Follow Up Time: 1 week

## 2024-08-09 NOTE — DISCHARGE NOTE PROVIDER - NSDCCPCAREPLAN_GEN_ALL_CORE_FT
PRINCIPAL DISCHARGE DIAGNOSIS  Diagnosis: Acute cholecystitis  Assessment and Plan of Treatment: Gallstones are loose lumps of hard material like stone. They can form in the gallbladder. This is a small organ that stores bile. Bile is a liquid that helps you digest fats. Gallstones can form when this liquid hardens. The stones may not cause symptoms. Or they can cause pain or infection.  You were treated with antibiotics for an infection and NSAIDS for the pain.   To help prevent symptoms:  > Eat a low-fat diet. Fatty foods cause the gallbladder to release bile to help digest the fats. This can cause pain when you have gallstones.   >Limit fatty dairy foods, animal fats, and vegetable oils.  >Read food labels. Make sure the foods you are choosing are low in fat.  When to call your healthcare provider  >Call your healthcare provider right away if you have any of these:  >Severe pain in the upper belly, shoulder, or back  >Fever of 100.4°F ( 38°C) or higher  >Nausea or vomiting  >Yellowing of your skin or eyes (jaundice)        SECONDARY DISCHARGE DIAGNOSES  Diagnosis: Intrahepatic bile duct dilation  Assessment and Plan of Treatment: Your CT scan revealed central intrahepatic biliary duct dilation and an ill-defined tissue infiltration in the mary hepatis.   You were recommended an evaluation of the area with an MRCP and did not want to do the evaluation in the hospital. MRCP is a type of MRI used to take pictures of your gallbladder, bile duct, and pancreas. It is essential that you follow up on these findings as malignancy and other etiologies cannot be ruled out.   Call your doctor or specialist if:  >You have new or increased abdominal pain or other symptoms.  >Temperature above 100.4  >Nausea vomiting  >yellowing of eyes or skin        PRINCIPAL DISCHARGE DIAGNOSIS  Diagnosis: Acute cholecystitis  Assessment and Plan of Treatment: Gallstones are loose lumps of hard material like stone. They can form in the gallbladder. This is a small organ that stores bile. Bile is a liquid that helps you digest fats. Gallstones can form when this liquid hardens. The stones may not cause symptoms. Or they can cause pain or infection.  You were treated with antibiotics for an infection and NSAIDS for the pain.   To help prevent symptoms:  > Eat a low-fat diet. Fatty foods cause the gallbladder to release bile to help digest the fats. This can cause pain when you have gallstones.   >Limit fatty dairy foods, animal fats, and vegetable oils.  >Read food labels. Make sure the foods you are choosing are low in fat.  When to call your healthcare provider  >Call your healthcare provider right away if you have any of these:  >Severe pain in the upper belly, shoulder, or back  >Fever of 100.4°F ( 38°C) or higher  >Nausea or vomiting  >Yellowing of your skin or eyes (jaundice)        SECONDARY DISCHARGE DIAGNOSES  Diagnosis: Intrahepatic bile duct dilation  Assessment and Plan of Treatment: Your CT scan revealed central intrahepatic biliary duct dilation and an ill-defined tissue infiltration in the mary hepatis.   You were recommended an evaluation of the area with an MRCP and did not want to do the evaluation in the hospital. MRCP is a type of MRI used to take pictures of your gallbladder, bile duct, and pancreas. It is essential that you follow up on these findings as malignancy and other etiologies cannot be ruled out.   Call your doctor or specialist if:  >You have new or increased abdominal pain or other symptoms.  >Temperature above 100.4  >Nausea vomiting  >yellowing of eyes or skin   Please follow up with your PCP within 1 week of discharge for further workup.  Please also follow up with Dr. Dias (general surgery) within 1 month of discharge.

## 2024-08-09 NOTE — DISCHARGE NOTE PROVIDER - HOSPITAL COURSE
HPI:  53F with no known PMHx presented to ED for abdominal pain x 3 days. Pt states that pain started on Monday, 8/5, and acutely worsened yesterday to 10/10 pain that kept her awake last night. The pain started in the middle epigastric region with radiation to right flank around to her right scapula. RUQ pain increased w/ inspiration. The patient has had dark green stools over this same time period which is unusual for her. No diarrhea, no vomiting, no nausea. She also states that she has also felt more stuffed and bloated and has not been able to burp despite feeling the urge. She had a similar episode around 9-10yrs ago that she was treated for at the hospital with medication. Denies chest pain, recent infection, dysuria, nausea/vomiting, dizziness, headaches, fever/chills.     ED course: Pt afebrile, bradycardic (49-53), BP stable. CBC/CMP/urinalysis/CXR unremarkable. S/p 3.375mg Zosyn. CT abd/pelvis revealed marked distension of the gallbladder containing sludge vs. stones with minimal wall thickening, with possibility of acute cholecystitis, as well as mild central intrahepatic biliary duct dilation and ill-defined soft tissue infiltration in the mary hepatis. RUQ US revealed gallbladder distention w/ multiple stones w/o additional evidence of acute cholecystitis. Surgery evaluated pt in ED due to c/f acute cholecystitis vs. symptomatic cholelithiasis, with recommendation for lap cholecystectomy. Pt declined surgery and was admitted for medical management. Pt declining MRCP in the hospital due to claustrophobia. Risks were discussed with the patient including inflammation, malignancy, and other causes of the abnormal CT findings.      Hospital Course:      The patient is afebrile, hemodynamically stable and medically optimized for discharge home with follow up with ____. On day of discharge, patient is clinically stable with no new exam findings or acute symptoms compared to prior. The patient was seen by the attending physician on the date of discharge and deemed stable and acceptable for discharge. The patient's chronic medical conditions were treated accordingly per the patient's home medication regimen. The patient's medication reconciliation (with changes made to chronic medications), follow up appointments, discharge orders, instructions, and significant lab and diagnostic studies are as noted.      Important Medication Changes and Reason:  Cipro and fagyl antibiotics for acute cholecystitis x 7 days       Active or Pending Issues Requiring Follow-up:  CT Abdomen/Pelvis findings from 8/8      Advanced Directives:   [ X] Full code  [ ] DNR  [ ] Hospice             HPI:  53F with no known PMHx presented to ED for abdominal pain x 3 days. Pt states that pain started on Monday, 8/5, and acutely worsened yesterday to 10/10 pain that kept her awake last night. The pain started in the middle epigastric region with radiation to right flank around to her right scapula. RUQ pain increased w/ inspiration. The patient has had dark green stools over this same time period which is unusual for her. No diarrhea, no vomiting, no nausea. She also states that she has also felt more stuffed and bloated and has not been able to burp despite feeling the urge. She had a similar episode around 9-10yrs ago that she was treated for at the hospital with medication. Denies chest pain, recent infection, dysuria, nausea/vomiting, dizziness, headaches, fever/chills.     ED course: Pt afebrile, bradycardic (49-53), BP stable. CBC/CMP/urinalysis/CXR unremarkable. S/p 3.375mg Zosyn. CT abd/pelvis revealed marked distension of the gallbladder containing sludge vs. stones with minimal wall thickening, with possibility of acute cholecystitis, as well as mild central intrahepatic biliary duct dilation and ill-defined soft tissue infiltration in the mary hepatis. RUQ US revealed gallbladder distention w/ multiple stones w/o additional evidence of acute cholecystitis. Surgery evaluated pt in ED due to c/f acute cholecystitis vs. symptomatic cholelithiasis, with recommendation for lap cholecystectomy. Pt declined surgery and was admitted for medical management. Pt declining MRCP in the hospital due to claustrophobia. Risks were discussed with the patient including inflammation, malignancy, and other causes of the abnormal CT findings.      Hospital Course:      The patient is afebrile, hemodynamically stable and medically optimized for discharge home with follow up with your primary care provider. On day of discharge, patient is clinically stable with no new exam findings or acute symptoms compared to prior. The patient was seen by the attending physician on the date of discharge and deemed stable and acceptable for discharge. The patient's chronic medical conditions were treated accordingly per the patient's home medication regimen. The patient's medication reconciliation (with changes made to chronic medications), follow up appointments, discharge orders, instructions, and significant lab and diagnostic studies are as noted.      Important Medication Changes and Reason:  Cipro and fagyl antibiotics for acute cholecystitis x 7 days       Active or Pending Issues Requiring Follow-up:  CT Abdomen/Pelvis findings from 8/8      Advanced Directives:   [ X] Full code  [ ] DNR  [ ] Hospice             HPI:  53F with no known PMHx presented to ED for abdominal pain x 3 days. Pt states that pain started on Monday, 8/5, and acutely worsened yesterday to 10/10 pain that kept her awake last night. The pain started in the middle epigastric region with radiation to right flank around to her right scapula. RUQ pain increased w/ inspiration. The patient has had dark green stools over this same time period which is unusual for her. No diarrhea, no vomiting, no nausea. She also states that she has also felt more stuffed and bloated and has not been able to burp despite feeling the urge. She had a similar episode around 9-10yrs ago that she was treated for at the hospital with medication. Denies chest pain, recent infection, dysuria, nausea/vomiting, dizziness, headaches, fever/chills.       Hospital Course: Pt afebrile, bradycardic (49-53), BP stable. CBC/CMP/urinalysis/CXR unremarkable. S/p 3.375mg Zosyn. CT abd/pelvis revealed marked distension of the gallbladder containing sludge vs. stones with minimal wall thickening, with possibility of acute cholecystitis, as well as mild central intrahepatic biliary duct dilation and ill-defined soft tissue infiltration in the mary hepatis. RUQ US revealed gallbladder distention w/ multiple stones w/o additional evidence of acute cholecystitis. Surgery evaluated pt in ED due to c/f acute cholecystitis vs. symptomatic cholelithiasis, with recommendation for lap cholecystectomy. Pt declined surgery and was admitted for medical management. Pt declining MRCP in the hospital due to claustrophobia. Risks were discussed with the patient including inflammation, malignancy, and other causes of the abnormal CT findings.        The patient is afebrile, hemodynamically stable and medically optimized for discharge home with follow up with your primary care provider. On day of discharge, patient is clinically stable with no new exam findings or acute symptoms compared to prior. The patient was seen by the attending physician on the date of discharge and deemed stable and acceptable for discharge. The patient's chronic medical conditions were treated accordingly per the patient's home medication regimen. The patient's medication reconciliation (with changes made to chronic medications), follow up appointments, discharge orders, instructions, and significant lab and diagnostic studies are as noted.      Important Medication Changes and Reason:  Cipro and metronidazole antibiotics for acute cholecystitis x 7 days       Active or Pending Issues Requiring Follow-up:  CT Abdomen/Pelvis findings from 8/8      Advanced Directives:   [ X] Full code  [ ] DNR  [ ] Hospice